# Patient Record
Sex: MALE | Race: BLACK OR AFRICAN AMERICAN | NOT HISPANIC OR LATINO | Employment: STUDENT | URBAN - METROPOLITAN AREA
[De-identification: names, ages, dates, MRNs, and addresses within clinical notes are randomized per-mention and may not be internally consistent; named-entity substitution may affect disease eponyms.]

---
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
## 2024-03-13 ENCOUNTER — TELEPHONE (OUTPATIENT)
Dept: PRIMARY CARE CLINIC | Facility: CLINIC | Age: 19
End: 2024-03-13
Payer: MEDICAID

## 2025-05-15 NOTE — PROGRESS NOTES
@11:21       TANISHA reached out to [facility], a medical  to find out if they are accepting new patients. Staff informed TANISHA that there is currently about a three month waiting list. They do provide transportation to and from the  but only twice a week since they are short staffed. TANISHA called mom to discuss but there was no answer. TANISHA LVM requesting a call back.     Medical  contact: [facility] [phone]. Mom will need to call them to begin application process.   
Statement Selected